# Patient Record
(demographics unavailable — no encounter records)

---

## 2024-11-01 NOTE — PHYSICAL EXAM
[No Acute Distress] : no acute distress [Soft] : abdomen soft [Non Tender] : non-tender [Non-distended] : non-distended [No Masses] : no abdominal mass palpated [Coordination Grossly Intact] : coordination grossly intact [No Focal Deficits] : no focal deficits [Normal Gait] : normal gait [Normal] : affect was normal and insight and judgment were intact

## 2024-11-01 NOTE — REVIEW OF SYSTEMS
Addended by: NAEEM BRIGGS on: 2/14/2020 09:19 AM     Modules accepted: Orders     [Negative] : Heme/Lymph

## 2024-11-01 NOTE — HISTORY OF PRESENT ILLNESS
[de-identified] : 77 year male HTN, HLD, Anxiety/Depression, Pleural Amyloidosis, Cervical Arthritis, Renal Insufficiency, Diabetes presents FU.  Patient is s/o TURB and reports improved symptoms of LUTS.  He is off Januvia and continues on Ozempic but states no weight loss.  He will see Endocrine next week.

## 2024-11-01 NOTE — PLAN
[FreeTextEntry1] : HTN- continue Atenolol, Ramipril Diabetes- Continue Ozempic HLD- continue Atorvastatin 40mg daily and Fenofibrate Depression- stable on Zoloft 50mg daily Pleural Amyloidosis- stable, will monitor with Pulm Allergies- Xyzal, Flonase prn

## 2024-11-06 NOTE — HISTORY OF PRESENT ILLNESS
[FreeTextEntry1] : SAPNA SOLITARIO Jun 24 1946  Language: English Date of First visit: 03/05/2024 Accompanied by: self Contact info: Referring Provider/PCP: Dr. Driver  Fax: tw   CC/ Problem List: Nephrolithiasis BPH s/p TURP gross hematuria =============================================================================== FIRST VISIT / Summary: Very pleasant 77 year old M here for LUTS s/p prostate surgery x2 about 14 and 18 years ago. He also has kidney stones. Always passes naturally. Had episode with significant blood in urine, he may have been passing a stone. Given smoking history rec full workup. Recently some gross hematuria, thinks was passing stone, resovled after passing something (clot vs stone).  ED: had trouble before and continued after TURP.  ------------------------------------------------------------------------------------------- INTERVAL VISITS: The patient's medications and allergies were reviewed and edited below. Dated 11/05/2024  Here for follow up. He is s/p TURP 4/11/2024. His urination is much better. ED - had tried viagra 3-4 times.   Needs MRI/CT abdomen done to evaluate right renal cyst/mass  ===============================================================================  PMH: HTN, DM, HLD, CKD Meds: januvia, flomax BID, atenolol, amlodipine, ramipril, finofibrate, sertraline All: nkda FHx: No  malignancies SocHx: former smoker 20+ pack years quit 40 years ago.  PSH: 3x hernia, appendix, TURP x2 last approx 2010  ROS: Review of Systems is as per HPI unless otherwise denoted below  =============================================================================== DATA: LABS (SELECTED):-------------------------------------------------------------------------------------------------- 2/15/2024: UA with 760 RBC, cx negative, Cr 1.89 11/1/2024 A1C 8.4, Cr 2.09   RADS:------------------------------------------------------------------------------------------------------------------- 3/19/24: CT scan: bladder stones, kidney stones (1-2 mm). RIght midpole cyst vs mass, 1.6cm greatest demention, rec MRI for further characterization.  PATHOLOGY/CYTOLOGY:------------------------------------------------------------------------------------------- 4/11/24: Prostate Chips, 18g benign. Bladder stones  VOIDING STUDIES: ---------------------------------------------------------------------------------------------------- 03/05/2024 Uroflow with peak flow 7.6, plateau, volume 241cc. PVR 62 04/12/2024 Fill/Pull: Instilled 200 cc water, catheter removed in its entirety, voided 200 clear light punch colored urine. No clots noted.   STONE STUDIES: (Analysis/LLSA)----------------------------------------------------------------------------------   PROCEDURES: ----------------------------------------------------------------------------------------------- 04/02/2024 Cysto with bladder stones, regrowth including median lobe, and bladder stones. Gr 3 trabeculation.   =============================================================================== PHYSICAL EXAM:   FOCUSED: ----------------------------------------------------------------------------------------------------------------   ======================================================================================= DISCUSSION: ======================================================================================= ASSESSMENT and PLAN  1. BPH / LUTS - cont flomax BID x1 week now s/p TURP 4/11 - doing great from urinary perspective  2. Nephrolithiasis - very small no intervention needed - CT below will evalute size  5. Right renal cyst/mass - CT abdomen needed for follow-up exam  ======================================================================================= The total time personally spent preparing for this visit (reviewing test results, obtaining external history) and during the visit (ordering tests/medications, spent face to face with the patient / family and counseling them on the above), as well as after the visit (on clinical documentation and coordination with other care providers) was approximately 35 minutes in addition to any procedures.   Thank you for allowing me to assist in the care of your patient. Should you have any questions please do not hesitate to reach out to me.   Tate Hare MD [facility] Physician [facility] for Urology  [place] Office: [address] [facility], [address] [place] [address] T: [phone] F: [phone]  [place] Office: [address] [place] [address], 1st [place] [address] T: [phone] F: [phone]

## 2024-11-06 NOTE — HISTORY OF PRESENT ILLNESS
[FreeTextEntry1] : SAPNA SOLITARIO Jun 24 1946  Language: English Date of First visit: 03/05/2024 Accompanied by: self Contact info: Referring Provider/PCP: Dr. Driver  Fax: tw   CC/ Problem List: Nephrolithiasis BPH s/p TURP gross hematuria =============================================================================== FIRST VISIT / Summary: Very pleasant 77 year old M here for LUTS s/p prostate surgery x2 about 14 and 18 years ago. He also has kidney stones. Always passes naturally. Had episode with significant blood in urine, he may have been passing a stone. Given smoking history rec full workup. Recently some gross hematuria, thinks was passing stone, resovled after passing something (clot vs stone).  ED: had trouble before and continued after TURP.  ------------------------------------------------------------------------------------------- INTERVAL VISITS: The patient's medications and allergies were reviewed and edited below. Dated 11/05/2024  Here for follow up. He is s/p TURP 4/11/2024. His urination is much better. ED - had tried viagra 3-4 times.   Needs MRI/CT abdomen done to evaluate right renal cyst/mass  ===============================================================================  PMH: HTN, DM, HLD, CKD Meds: januvia, flomax BID, atenolol, amlodipine, ramipril, finofibrate, sertraline All: nkda FHx: No  malignancies SocHx: former smoker 20+ pack years quit 40 years ago.  PSH: 3x hernia, appendix, TURP x2 last approx 2010  ROS: Review of Systems is as per HPI unless otherwise denoted below  =============================================================================== DATA: LABS (SELECTED):-------------------------------------------------------------------------------------------------- 2/15/2024: UA with 760 RBC, cx negative, Cr 1.89 11/1/2024 A1C 8.4, Cr 2.09   RADS:------------------------------------------------------------------------------------------------------------------- 3/19/24: CT scan: bladder stones, kidney stones (1-2 mm). RIght midpole cyst vs mass, 1.6cm greatest demention, rec MRI for further characterization.  PATHOLOGY/CYTOLOGY:------------------------------------------------------------------------------------------- 4/11/24: Prostate Chips, 18g benign. Bladder stones  VOIDING STUDIES: ---------------------------------------------------------------------------------------------------- 03/05/2024 Uroflow with peak flow 7.6, plateau, volume 241cc. PVR 62 04/12/2024 Fill/Pull: Instilled 200 cc water, catheter removed in its entirety, voided 200 clear light punch colored urine. No clots noted.   STONE STUDIES: (Analysis/LLSA)----------------------------------------------------------------------------------   PROCEDURES: ----------------------------------------------------------------------------------------------- 04/02/2024 Cysto with bladder stones, regrowth including median lobe, and bladder stones. Gr 3 trabeculation.   =============================================================================== PHYSICAL EXAM:   FOCUSED: ----------------------------------------------------------------------------------------------------------------   ======================================================================================= DISCUSSION: ======================================================================================= ASSESSMENT and PLAN  1. BPH / LUTS - cont flomax BID x1 week now s/p TURP 4/11 - doing great from urinary perspective  2. Nephrolithiasis - very small no intervention needed - CT below will evalute size  5. Right renal cyst/mass - CT abdomen needed for follow-up exam  ======================================================================================= The total time personally spent preparing for this visit (reviewing test results, obtaining external history) and during the visit (ordering tests/medications, spent face to face with the patient / family and counseling them on the above), as well as after the visit (on clinical documentation and coordination with other care providers) was approximately 35 minutes in addition to any procedures.   Thank you for allowing me to assist in the care of your patient. Should you have any questions please do not hesitate to reach out to me.   Tate Hare MD [facility] Physician [facility] for Urology  [place] Office: [address] [facility] [facility], [address] [place] [address] T: [phone] F: [phone]  [place] Office: [address] [place] [address], 1st [place] [address] T: [phone] F: [phone]

## 2024-11-14 NOTE — HISTORY OF PRESENT ILLNESS
[FreeTextEntry1] : Patient is a 77 yo male with a PMHx of HTN, HLD, anxiety, and asthma, BPH s/p TURP 04/24. He was referred to us by Dr. Italo Lennon for a right lower lobe pleural-based mass, biopsy positive for amyloid.   Patient's case was discussed at Tumor Board on 03/09/2023, consensus was to observe. His last CT chest was in Oct 2023, which showed the RLL mass has been stable.  He presents today with a follow-up image.   CT chest done on

## 2024-11-14 NOTE — DATA REVIEWED
[FreeTextEntry1] : CT chest 10/30/23: - 3.1x 1.2 cm ovid soft tissue mass in the periphery of the posterior right lower lobe abutting the adjacent pleura, with areas of heterogeneous calcification, favored to be intraparenchymal vs pleural in location. Differential diagnosis includes granulomatous disease vs neoplastic etiology. Differential diagnosis includes solitary fibrous tumor of the pleura and schwannoma. Consider follow up PET or tissue sampling. - Additional small solid lung nodules measuring up to 4 mm in diameter. Evidence of granulomatous disease. -Ecstatic mid ascending aorta measuring 44 mm -Enlarged pulmonary artery measuring 3.7 cm, which can be seen in the setting of pulmonary hypertension. -Heterogeneous thyroid gland with the right thyroid lobe calcifications. F/u may be obtained with dedicated thyroid ultrasound, as clinically warranted.

## 2024-12-03 NOTE — PHYSICAL EXAM
[Normal] : affect was normal and insight and judgment were intact [No Acute Distress] : no acute distress [Well Nourished] : well nourished [Well Developed] : well developed [Well-Appearing] : well-appearing [Normal Sclera/Conjunctiva] : normal sclera/conjunctiva [PERRL] : pupils equal round and reactive to light [EOMI] : extraocular movements intact [Normal Outer Ear/Nose] : the outer ears and nose were normal in appearance [Normal Oropharynx] : the oropharynx was normal [No JVD] : no jugular venous distention [No Lymphadenopathy] : no lymphadenopathy [Supple] : supple [Thyroid Normal, No Nodules] : the thyroid was normal and there were no nodules present [No Respiratory Distress] : no respiratory distress  [No Accessory Muscle Use] : no accessory muscle use [Clear to Auscultation] : lungs were clear to auscultation bilaterally [Normal Rate] : normal rate  [Regular Rhythm] : with a regular rhythm [Normal S1, S2] : normal S1 and S2 [No Murmur] : no murmur heard [No Carotid Bruits] : no carotid bruits [No Abdominal Bruit] : a ~M bruit was not heard ~T in the abdomen [No Varicosities] : no varicosities [Pedal Pulses Present] : the pedal pulses are present [No Edema] : there was no peripheral edema [No Palpable Aorta] : no palpable aorta [No Extremity Clubbing/Cyanosis] : no extremity clubbing/cyanosis [Soft] : abdomen soft [Non Tender] : non-tender [Non-distended] : non-distended [No Masses] : no abdominal mass palpated [No HSM] : no HSM [Normal Bowel Sounds] : normal bowel sounds [Normal Posterior Cervical Nodes] : no posterior cervical lymphadenopathy [Normal Anterior Cervical Nodes] : no anterior cervical lymphadenopathy [No CVA Tenderness] : no CVA  tenderness [No Spinal Tenderness] : no spinal tenderness [No Joint Swelling] : no joint swelling [Grossly Normal Strength/Tone] : grossly normal strength/tone [No Rash] : no rash [Coordination Grossly Intact] : coordination grossly intact [No Focal Deficits] : no focal deficits [Normal Gait] : normal gait [Deep Tendon Reflexes (DTR)] : deep tendon reflexes were 2+ and symmetric [Normal Affect] : the affect was normal [Normal Insight/Judgement] : insight and judgment were intact

## 2024-12-03 NOTE — HEALTH RISK ASSESSMENT
[No] : No [0] : 1) Little interest or pleasure doing things: Not at all (0) [2] : 2) Feeling down, depressed, or hopeless for more than half of the days (2) [PHQ-2 Negative - No further assessment needed] : PHQ-2 Negative - No further assessment needed [Former] : Former [10-14] : 10-14 [> 15 Years] : > 15 Years

## 2024-12-04 NOTE — HISTORY OF PRESENT ILLNESS
[de-identified] : 77 year male HTN, HLD, Anxiety/Depression, Pleural Amyloidosis, Cervical Arthritis, Renal Insufficiency, Diabetes presents FU.  Patient is s/p TURB and reports improved symptoms of LUTS.  He is off Januvia and continues on Ozempic but states no weight loss.  He will see Endocrine next week.   [No Pertinent Cardiac History] : no history of aortic stenosis, atrial fibrillation, coronary artery disease, recent myocardial infarction, or implantable device/pacemaker [No Adverse Anesthesia Reaction] : no adverse anesthesia reaction in self or family member [Chronic Kidney Disease] : chronic kidney disease [Diabetes] : diabetes [(Patient denies any chest pain, claudication, dyspnea on exertion, orthopnea, palpitations or syncope)] : Patient denies any chest pain, claudication, dyspnea on exertion, orthopnea, palpitations or syncope [Asthma] : no asthma [COPD] : no COPD [Sleep Apnea] : no sleep apnea [Smoker] : not a smoker [Chronic Anticoagulation] : no chronic anticoagulation [FreeTextEntry4] : 77 year male HTN, HLD, Anxiety/Depression, Pulm nodule, Cervical Arthritis, Renal Insufficiency, Diabetes presents FU. Patient saw Urology and Nephrology and had CT abd that was reportedly negative.  Pulm nodule, stable and will fu in 2 weeks.  He is off Januvia and continues on Ozempic with no side effects.  He follows with endocrine and BS at home 120-200s. He will be going for corneal transplant and had preop labs, cxr and ekg done. He saw Dr. Ehsan Alcocer last week and reports obtaining cardiac clearance already. EKG RBBB, no significant change from 1/2024 Add:  labs reviewed HGA1C 7.9 improving, BUN/CR 33/1.94 stable. Cardiac consult reviewed and cleared for surgery.

## 2024-12-04 NOTE — PLAN
[FreeTextEntry1] : HTN- continue Atenolol, hold Ramipril on the day of procedure Diabetes- Ozempic, hold 1 week prior to surgery HLD- continue Atorvastatin 40mg daily and Fenofibrate Depression- stable on Zoloft 50mg daily, continue Pulm nodule/ovoid density- stable, will monitor with Pulm CXR 11/27/2024 noted Clear for surgery

## 2024-12-04 NOTE — HISTORY OF PRESENT ILLNESS
[de-identified] : 77 year male HTN, HLD, Anxiety/Depression, Pleural Amyloidosis, Cervical Arthritis, Renal Insufficiency, Diabetes presents FU.  Patient is s/p TURB and reports improved symptoms of LUTS.  He is off Januvia and continues on Ozempic but states no weight loss.  He will see Endocrine next week.   [No Pertinent Cardiac History] : no history of aortic stenosis, atrial fibrillation, coronary artery disease, recent myocardial infarction, or implantable device/pacemaker [No Adverse Anesthesia Reaction] : no adverse anesthesia reaction in self or family member [Chronic Kidney Disease] : chronic kidney disease [Diabetes] : diabetes [(Patient denies any chest pain, claudication, dyspnea on exertion, orthopnea, palpitations or syncope)] : Patient denies any chest pain, claudication, dyspnea on exertion, orthopnea, palpitations or syncope [Asthma] : no asthma [COPD] : no COPD [Sleep Apnea] : no sleep apnea [Smoker] : not a smoker [Chronic Anticoagulation] : no chronic anticoagulation [FreeTextEntry4] : 77 year male HTN, HLD, Anxiety/Depression, Pulm nodule, Cervical Arthritis, Renal Insufficiency, Diabetes presents FU. Patient saw Urology and Nephrology and had CT abd that was reportedly negative.  Pulm nodule, stable and will fu in 2 weeks.  He is off Januvia and continues on Ozempic with no side effects.  He follows with endocrine and BS at home 120-200s. He will be going for corneal transplant and had preop labs, cxr and ekg done. He saw Dr. Ehsan Alcocer last week and reports obtaining cardiac clearance already. EKG RBBB, no significant change from 1/2024 Add:  labs reviewed HGA1C 7.9 improving, BUN/CR 33/1.94 stable. Cardiac consult reviewed and cleared for surgery.

## 2024-12-04 NOTE — HISTORY OF PRESENT ILLNESS
[de-identified] : 77 year male HTN, HLD, Anxiety/Depression, Pleural Amyloidosis, Cervical Arthritis, Renal Insufficiency, Diabetes presents FU.  Patient is s/p TURB and reports improved symptoms of LUTS.  He is off Januvia and continues on Ozempic but states no weight loss.  He will see Endocrine next week.   [No Pertinent Cardiac History] : no history of aortic stenosis, atrial fibrillation, coronary artery disease, recent myocardial infarction, or implantable device/pacemaker [No Adverse Anesthesia Reaction] : no adverse anesthesia reaction in self or family member [Chronic Kidney Disease] : chronic kidney disease [Diabetes] : diabetes [(Patient denies any chest pain, claudication, dyspnea on exertion, orthopnea, palpitations or syncope)] : Patient denies any chest pain, claudication, dyspnea on exertion, orthopnea, palpitations or syncope [Asthma] : no asthma [COPD] : no COPD [Sleep Apnea] : no sleep apnea [Smoker] : not a smoker [Chronic Anticoagulation] : no chronic anticoagulation [FreeTextEntry4] : 77 year male HTN, HLD, Anxiety/Depression, Pulm nodule, Cervical Arthritis, Renal Insufficiency, Diabetes presents FU. Patient saw Urology and Nephrology and had CT abd that was reportedly negative.  Pulm nodule, stable and will fu in 2 weeks.  He is off Januvia and continues on Ozempic with no side effects.  He follows with endocrine and BS at home 120-200s. He will be going for corneal transplant and had preop labs, cxr and ekg done. He saw Dr. Ehsan Alcocer last week and reports obtaining cardiac clearance already. EKG RBBB, no significant change from 1/2024 Add:  labs reviewed HGA1C 7.9 improving, BUN/CR 33/1.94 stable. Cardiac consult reviewed and cleared for surgery.

## 2024-12-04 NOTE — HISTORY OF PRESENT ILLNESS
[de-identified] : 77 year male HTN, HLD, Anxiety/Depression, Pleural Amyloidosis, Cervical Arthritis, Renal Insufficiency, Diabetes presents FU.  Patient is s/p TURB and reports improved symptoms of LUTS.  He is off Januvia and continues on Ozempic but states no weight loss.  He will see Endocrine next week.   [No Pertinent Cardiac History] : no history of aortic stenosis, atrial fibrillation, coronary artery disease, recent myocardial infarction, or implantable device/pacemaker [No Adverse Anesthesia Reaction] : no adverse anesthesia reaction in self or family member [Chronic Kidney Disease] : chronic kidney disease [Diabetes] : diabetes [(Patient denies any chest pain, claudication, dyspnea on exertion, orthopnea, palpitations or syncope)] : Patient denies any chest pain, claudication, dyspnea on exertion, orthopnea, palpitations or syncope [Asthma] : no asthma [COPD] : no COPD [Sleep Apnea] : no sleep apnea [Smoker] : not a smoker [Chronic Anticoagulation] : no chronic anticoagulation [FreeTextEntry4] : 77 year male HTN, HLD, Anxiety/Depression, Pulm nodule, Cervical Arthritis, Renal Insufficiency, Diabetes presents FU. Patient saw Urology and Nephrology and had CT abd that was reportedly negative.  Pulm nodule, stable and will fu in 2 weeks.  He is off Januvia and continues on Ozempic with no side effects.  He follows with endocrine and BS at home 120-200s. He will be going for corneal transplant and had preop labs, cxr and ekg done. He saw Dr. Ehsan Alcocer last week and reports obtaining cardiac clearance already. EKG RBBB, no significant change from 1/2024 Add:  labs reviewed HGA1C 7.9 improving, BUN/CR 33/1.94 stable. Cardiac consult reviewed and cleared for surgery.

## 2025-01-16 NOTE — ASSESSMENT
[FreeTextEntry1] :  79 yo male with a PMHx of HTN, HLD, anxiety, and asthma, BPH s/p TURP 04/24, right lower lobe pleural-based mass, biopsy positive for amyloid.  Referred to [facility] Dr. Mcqueen, Case presented to Tumor board on 3/9/23 with suggestion for repeat CT in 6 months. CT chest 10/30/23: - 3.1x 1.2 cm ovid soft tissue mass in the periphery of the posterior right lower lobe abutting the adjacent pleura, with areas of heterogeneous calcification, favored to be intraparenchymal vs pleural in location. Differential diagnosis includes granulomatous disease vs neoplastic etiology. Differential diagnosis includes solitary fibrous tumor of the pleura and schwannoma. Consider follow up PET or tissue sampling. - Additional small solid lung nodules measuring up to 4 mm in diameter. Evidence of granulomatous disease. -Ecstatic mid ascending aorta measuring 44 mm -Enlarged pulmonary artery measuring 3.7 cm, which can be seen in the setting of pulmonary hypertension. -Heterogeneous thyroid gland with the right thyroid lobe calcifications. F/u may be obtained with dedicated thyroid ultrasound, as clinically warranted. After last visit with Dr. Mcqueen, recommend CT in 1 year.   Reports being in baseline state of health. No increase in SOB/Cough.  On exam, lungs are clear, no wheezing or rhonchi, in NAD. Care plans discussed - repeat CT Chest ordered this visit, continue Flonase nasal spray and Albuterol HFA as needed for SOB/Wheezing. Follow-up in 1 month.

## 2025-01-16 NOTE — HISTORY OF PRESENT ILLNESS
[TextBox_4] :  77 yo male with a PMHx of HTN, HLD, anxiety, and asthma, BPH s/p TURP 04/24, right lower lobe pleural-based mass, biopsy positive for amyloid.

## 2025-02-20 NOTE — HISTORY OF PRESENT ILLNESS
[TextBox_4] :  79 yo male with a PMHx of HTN, HLD, anxiety, and asthma, BPH s/p TURP 04/24, right lower lobe pleural-based mass, biopsy positive for amyloid.

## 2025-02-20 NOTE — ASSESSMENT
[FreeTextEntry1] :  77 yo male with a PMHx of HTN, HLD, anxiety, and asthma, BPH s/p TURP 04/24, right lower lobe pleural-based mass, biopsy positive for amyloid.  Referred to [facility] Dr. Mcqueen, Case presented to Tumor board on 3/9/23. CT chest 10/30/23: - 3.1x 1.2 cm ovid soft tissue mass in the periphery of the posterior right lower lobe abutting the adjacent pleura, with areas of heterogeneous calcification, favored to be intraparenchymal vs pleural in location. Differential diagnosis includes granulomatous disease vs neoplastic etiology. Differential diagnosis includes solitary fibrous tumor of the pleura and schwannoma. Consider follow up PET or tissue sampling. - Additional small solid lung nodules measuring up to 4 mm in diameter. Evidence of granulomatous disease. -Ecstatic mid ascending aorta measuring 44 mm -Enlarged pulmonary artery measuring 3.7 cm, which can be seen in the setting of pulmonary hypertension. -Heterogeneous thyroid gland with the right thyroid lobe calcifications. F/u may be obtained with dedicated thyroid ultrasound, as clinically warranted.  PET Scan 2/5/2025  - Hypermetabolic nodular opacity in the RLL of the lung. nonspecific but suspicious for malignancy. Unchanged, 3.3 x 1.4 cm, previously 3.3 x 1.3 cm. no lymphadenopathy. otherwise no abnormal Hypermetabolic activity to suggest malignancy   Reports being in baseline state of health. No increase in SOB/Cough.  On exam, lungs are clear, no wheezing or rhonchi, in NAD. Care plans discussed - Discussed PET scan findings. at this time his findings are likely 2/2 known amyloidosis. Patient directed to continue seeing [facility] Dr. Mcqueen, has appointment tomorrow. Refusing PFT this visit. Follow-up in 1 month.

## 2025-02-21 NOTE — HISTORY OF PRESENT ILLNESS
[FreeTextEntry1] : Patient is a 77 y/o male with a PMHx of HTN, HLD, anxiety, and asthma, BPH s/p TURP 04/24. He was referred to us by Dr. Italo Lennon for a right lower lobe pleural-based mass, biopsy positive for amyloid.  Patient's case was discussed at Tumor Board on 03/09/2023, consensus was to observe. His last CT chest was in Oct 2023, which showed the RLL mass has been stable. The plan was to obtain a CT in 1 year to confirm stability. When contacted for follow-up he opted to only follow with Dr. Lennon at the time.   He presents today for a follow-up visit to review most recent CT Chest and PET scan.   CT Chest 01/21/25:  Findings lungs/pleura:  3.3 x 1.6 x 4.6 subpleural irregular mass posterior medial right lower lobe, image 164. Mass measured 3.2 x 1.5 x 3.9 cm 11/19/24 using same landmarks. Lesion has progressively increased when compared to earlier exams.  Impression: 1. Enlarging 3.3 x 1.6 x 4.6 cm subpleural irregular mass posterior medial right lower lobe. Although non-specific, suspicious for malignancy  2. Non-specific minimal focal right lateral pleural thickening upper thorax 3. Calcified granuloma left lower lobe  4. Mild coronary artery calcifications. Mild aortic valve calcification with dilatation mid ascending aorta to 4.1cm. Consider PET-CT further evaluation  PET-CT 2/5/25: 1. Hypermetabolic nodular opacity in the medial right lower lobe of the lung. The imaging appearance of this opacity is non-specific, but suspicious for malignancy 2. Otherwise, no abnormal hypermetabolic activity to suggest malignancy at this time 3. Non-obstructing left renal calculus 4. Diverticular disease

## 2025-02-21 NOTE — ASSESSMENT
[FreeTextEntry1] : 79 y/o male with a PMHx of HTN, HLD, anxiety, and asthma, BPH s/p TURP 04/24. He was referred to us by Dr. Italo Lennon for a right lower lobe pleural-based mass, biopsy positive for amyloid.  Patient's case was discussed at Tumor Board on 03/09/2023, consensus was to observe. His last CT chest was in Oct 2023, which showed the RLL mass has been stable. The plan was to obtain a CT in 1 year to confirm stability. When contacted for follow-up he opted to only follow with Dr. Lennon at the time.  He presents today for a follow-up visit to review most recent CT Chest and PET scan.  CT Chest 01/21/25: Findings lungs/pleura: 3.3 x 1.6 x 4.6 subpleural irregular mass posterior medial right lower lobe, image 164. Mass measured 3.2 x 1.5 x 3.9 cm 11/19/24 using same landmarks. Lesion has progressively increased when compared to earlier exams. Impression: 1. Enlarging 3.3 x 1.6 x 4.6 cm subpleural irregular mass posterior medial right lower lobe. Although non-specific, suspicious for malignancy 2. Non-specific minimal focal right lateral pleural thickening upper thorax 3. Calcified granuloma left lower lobe 4. Mild coronary artery calcifications. Mild aortic valve calcification with dilatation mid ascending aorta to 4.1cm. Consider PET-CT further evaluation  PET-CT 2/5/25: 1. Hypermetabolic nodular opacity in the medial right lower lobe of the lung. The imaging appearance of this opacity is non-specific, but suspicious for malignancy 2. Otherwise, no abnormal hypermetabolic activity to suggest malignancy at this time 3. Non-obstructing left renal calculus 4. Diverticular disease  he feels well and has no complaints. His chest imaging including chest CT and PET-CT images were reviewed and shows findings consistent with his known amyloid disease. We will continue to follow him with a chest CT in 1 year.  Plam: Chest CT 1 year  I, Dr. Bronwyn Mcqueen MD, personally performed the evaluation and management (E/M) services for this established patient who presents today with (a) new problem(s)/exacerbation of (an) existing condition(s).  That E/M includes conducting the clinically appropriate interval history &/or exam, assessing all new/exacerbated conditions, and establishing a new plan of care. I have also independently reviewed the medical records and imaging at the time of this office visit, and discussed the above mentioned images with interpretations with the patient. Today, my MINDY was here to observe &/or participate in the visit & follow plan of care established by me.  Total time of 20 minutes with > 50% spent with the patient discussing radiologic studies and need for follow up in 1 year.

## 2025-04-22 NOTE — PHYSICAL EXAM
[No Acute Distress] : no acute distress [Soft] : abdomen soft [Non Tender] : non-tender [Non-distended] : non-distended [No Masses] : no abdominal mass palpated [Coordination Grossly Intact] : coordination grossly intact [No Focal Deficits] : no focal deficits [Normal Gait] : normal gait [Normal] : no CVA or spinal tenderness [de-identified] : pale

## 2025-04-22 NOTE — HISTORY OF PRESENT ILLNESS
**For patient's undergoing cataract surgery. In the event you decline a refractive package, traditional cataract surgery will be performed and a monofocal IOL will be implanted to target distance vision (or plano). [de-identified] : 78 year male HTN, HLD, Anxiety/Depression, Pleural Amyloidosis, Cervical Arthritis, Renal Insufficiency, Diabetes presents FU. He had GI bleed and was admitted to [facility] for 1 week.  He have multiple blood transfusion and was discharge with Hg 8.9.  He was also told he had low B12 and Diverticulitis.  Today he feels fatigue but no abd pain and bleeding has stopped. He saw GI while in the hospital and had colonoscopy.

## 2025-04-22 NOTE — PHYSICAL EXAM
[No Acute Distress] : no acute distress [Soft] : abdomen soft [Non Tender] : non-tender [Non-distended] : non-distended [No Masses] : no abdominal mass palpated [Coordination Grossly Intact] : coordination grossly intact [No Focal Deficits] : no focal deficits [Normal Gait] : normal gait [Normal] : no CVA or spinal tenderness [de-identified] : pale

## 2025-04-22 NOTE — PLAN
[FreeTextEntry1] : HTN- continue Atenolol, Ramipril Diabetes- Continue Ozempic HLD- continue Atorvastatin 40mg daily and Fenofibrate Depression- stable on Zoloft 50mg daily Pleural Amyloidosis- stable, will monitor with Pulm Allergies- Xyzal, Flonase prn GI bleed- FU with GI, iron, Ferritin, TIBC, B12/folate\ B12 def- B12 injection today  done

## 2025-04-22 NOTE — PLAN
[FreeTextEntry1] : HTN- continue Atenolol, Ramipril Diabetes- Continue Ozempic HLD- continue Atorvastatin 40mg daily and Fenofibrate Depression- stable on Zoloft 50mg daily Pleural Amyloidosis- stable, will monitor with Pulm Allergies- Xyzal, Flonase prn GI bleed- FU with GI, iron, Ferritin, TIBC, B12/folate\ B12 def- B12 injection today

## 2025-04-22 NOTE — HISTORY OF PRESENT ILLNESS
[de-identified] : 78 year male HTN, HLD, Anxiety/Depression, Pleural Amyloidosis, Cervical Arthritis, Renal Insufficiency, Diabetes presents FU. He had GI bleed and was admitted to [facility] for 1 week.  He have multiple blood transfusion and was discharge with Hg 8.9.  He was also told he had low B12 and Diverticulitis.  Today he feels fatigue but no abd pain and bleeding has stopped. He saw GI while in the hospital and had colonoscopy.

## 2025-05-30 NOTE — HISTORY OF PRESENT ILLNESS
[de-identified] : 78 year male HTN, HLD, Anxiety/Depression, Pulm nodule, Diverticulitis, PAF, Renal Insufficiency, Diabetes presents FU. Patient saw Urology and Nephrology and had CT abd that was reportedly negative. He is doing well on Ozempic and recently started on Jardiance.  Last A1C with Endocrine 6.2, patient reports intermittently light headed and BS 50s occ but often low 100s. He reports BP low and feeling fatigue at times. He has since dec Atenolol to 1/2 tab daily with good BP control. He has plans for Watchman device for stroke prevention and will see Cardio next week. He reports heartburn symptoms several times a week, improved with pepcid prn.  He is feeling better overall and reports improved mood.

## 2025-05-30 NOTE — HEALTH RISK ASSESSMENT
[Monthly or less (1 pt)] : Monthly or less (1 point) [1 or 2 (0 pts)] : 1 or 2 (0 points) [Never (0 pts)] : Never (0 points) [0] : 1) Little interest or pleasure doing things: Not at all (0) [Patient reported colonoscopy was normal] : Patient reported colonoscopy was normal [HIV test declined] : HIV test declined [Hepatitis C test declined] : Hepatitis C test declined [With Family] : lives with family [] :  [Feels Safe at Home] : Feels safe at home [Good] : ~his/her~  mood as  good [1] : 2) Feeling down, depressed, or hopeless for several days (1) [PHQ-2 Negative - No further assessment needed] : PHQ-2 Negative - No further assessment needed [Yes] : takes [10-14] : 10-14 [> 15 Years] : > 15 Years [Fully functional (bathing, dressing, toileting, transferring, walking, feeding)] : Fully functional (bathing, dressing, toileting, transferring, walking, feeding) [Fully functional (using the telephone, shopping, preparing meals, housekeeping, doing laundry, using] : Fully functional and needs no help or supervision to perform IADLs (using the telephone, shopping, preparing meals, housekeeping, doing laundry, using transportation, managing medications and managing finances) [Reports changes in hearing] : Reports no changes in hearing [Reports changes in vision] : Reports no changes in vision [Reports changes in dental health] : Reports no changes in dental health [Travel to Developing Areas] : does not  travel to developing areas [TB Exposure] : is not being exposed to tuberculosis [ColonoscopyComments] : 4 yrs ago

## 2025-05-30 NOTE — PHYSICAL EXAM
[Normal Sclera/Conjunctiva] : normal sclera/conjunctiva [EOMI] : extraocular movements intact [Normal Outer Ear/Nose] : the outer ears and nose were normal in appearance [Normal] : affect was normal and insight and judgment were intact